# Patient Record
Sex: FEMALE | Race: OTHER | URBAN - METROPOLITAN AREA
[De-identification: names, ages, dates, MRNs, and addresses within clinical notes are randomized per-mention and may not be internally consistent; named-entity substitution may affect disease eponyms.]

---

## 2024-05-21 ENCOUNTER — INITIAL PRENATAL (OUTPATIENT)
Age: 34
End: 2024-05-21

## 2024-05-21 VITALS
DIASTOLIC BLOOD PRESSURE: 64 MMHG | BODY MASS INDEX: 21 KG/M2 | SYSTOLIC BLOOD PRESSURE: 110 MMHG | WEIGHT: 123 LBS | HEIGHT: 64 IN

## 2024-05-21 DIAGNOSIS — Z34.81 PRENATAL CARE, SUBSEQUENT PREGNANCY IN FIRST TRIMESTER: Primary | ICD-10-CM

## 2024-05-21 DIAGNOSIS — Z34.90 PREGNANCY, UNSPECIFIED GESTATIONAL AGE: ICD-10-CM

## 2024-05-21 PROCEDURE — 0500F INITIAL PRENATAL CARE VISIT: CPT | Performed by: OBSTETRICS & GYNECOLOGY

## 2024-05-21 NOTE — PROGRESS NOTES
Initial Prenatal Note    CC: Amenorrhea, positive pregnancy test    HPI:  Lindsay Thakkar is a 33 y.o. No obstetric history on file. who presents for initial prenatal appointment. Since her LMP she has experienced some nausea at times. She denies dysuria, discharge, vaginal bleeding.    LMP history:  The date of her LMP is  certain.  Her last menstrual period was normal.    Based on her LMP, her EDC is  and her EGA is 9 weeks,6 days.     Ultrasound data:  She had an  ultrasound done by the ultrasound tech today which revealed a viable katz pregnancy with a gestational age of 9 weeks and 4 days giving an EDC of 2024.    She is dated by FTUS=LMP.    Relevant past pregnancy history:  She has the following pregnancy history: SAB- 1 in January   She does not history of  delivery.    Relevant past medical history:   Noncontributory    Last Pap: 2023- normal per patient.     1. Have you been to the ER, urgent care clinic, or hospitalized since your last visit? This is the patients first visit with our practice.    2. Have you seen or consulted any other health care providers outside of the [facility] since your last visit?  This is the patients first visit with our practice.    She declines  a chaperone during the gynecologic exam today.      Liv Phillips MA

## 2024-05-21 NOTE — PROGRESS NOTES
Initial Obstetric Visit    Current pregnancy history:    Lindsay Thakkar is a  33 y.o. female Other Patient's last menstrual period was 03/13/2024 (exact date)..    She presents for the evaluation of amenorrhea and a positive pregnancy test.    LMP history:  The date of her LMP is 3/14/24 .    Her last menstrual period was normal and her LMP is certain.         Ultrasound data:  She had an ultrasound performed today in the office which revealed a viable katz pregnancy.  See ultrasound report for details.     Her final estimated due date is 12/19/24  DANIS is derived from her LMP, which is concordant with ultrasound dating.      Pregnancy symptoms:  Since her LMP she has experienced mild nausea.  She denies dysuria, discharge, vaginal bleeding.      Past pregnancy history:  She had a chemical pregnancy in [place], her OB there recommended progesterone once nightly vaginally 200mg.  She has been taking this without issue.      Relevant medical problems:  None      Social History:  Gilberto   - works as [employer] as a   Lindsay is not currently working  They are from [facility] originally     _ _ _ _ _ _ _ _ _ _ _ _ _ _ _ _ _ _ _ _ _ _ _ _ _ _ _ _ _ _ _ _     Substance history: Since first positive pregnancy test; negative for alcohol, tobacco and street drugs.             Exposure history:   There is/are no outdoor cat/s in the home. If yes, the patient was instructed to not change the cat litter.   She admits close contact with children on a regular basis.   She has had chicken pox or the vaccine in the past.   Patient denies issues with domestic violence.     Genetic Screening/Teratology Counseling: (Includes patient, baby's father, or anyone in either family with:)  1.  Patient's age >/= 35 at EDC?-- no  2.  Thalassemia (Mohawk, Greek, Mediterranean, or  background): MCV<80?--no.     3.  Neural tube defect (meningomyelocele, spina bifida, anencephaly)?--no.   4.

## 2024-06-12 ENCOUNTER — ROUTINE PRENATAL (OUTPATIENT)
Age: 34
End: 2024-06-12

## 2024-06-12 VITALS — SYSTOLIC BLOOD PRESSURE: 122 MMHG | BODY MASS INDEX: 21.34 KG/M2 | WEIGHT: 125 LBS | DIASTOLIC BLOOD PRESSURE: 60 MMHG

## 2024-06-12 DIAGNOSIS — Z34.90 PREGNANCY, UNSPECIFIED GESTATIONAL AGE: Primary | ICD-10-CM

## 2024-06-12 LAB
ABO + RH BLD: NORMAL
BLOOD BANK CMNT PATIENT-IMP: NORMAL
BLOOD GROUP ANTIBODIES SERPL: NORMAL
SPECIMEN EXP DATE BLD: NORMAL

## 2024-06-12 PROCEDURE — 0502F SUBSEQUENT PRENATAL CARE: CPT | Performed by: OBSTETRICS & GYNECOLOGY

## 2024-06-12 SDOH — ECONOMIC STABILITY: FOOD INSECURITY: WITHIN THE PAST 12 MONTHS, THE FOOD YOU BOUGHT JUST DIDN'T LAST AND YOU DIDN'T HAVE MONEY TO GET MORE.: NEVER TRUE

## 2024-06-12 SDOH — ECONOMIC STABILITY: HOUSING INSECURITY
IN THE LAST 12 MONTHS, WAS THERE A TIME WHEN YOU DID NOT HAVE A STEADY PLACE TO SLEEP OR SLEPT IN A SHELTER (INCLUDING NOW)?: NO

## 2024-06-12 SDOH — ECONOMIC STABILITY: FOOD INSECURITY: WITHIN THE PAST 12 MONTHS, YOU WORRIED THAT YOUR FOOD WOULD RUN OUT BEFORE YOU GOT MONEY TO BUY MORE.: NEVER TRUE

## 2024-06-12 SDOH — ECONOMIC STABILITY: INCOME INSECURITY: HOW HARD IS IT FOR YOU TO PAY FOR THE VERY BASICS LIKE FOOD, HOUSING, MEDICAL CARE, AND HEATING?: NOT HARD AT ALL

## 2024-06-12 ASSESSMENT — PATIENT HEALTH QUESTIONNAIRE - PHQ9
SUM OF ALL RESPONSES TO PHQ QUESTIONS 1-9: 0
SUM OF ALL RESPONSES TO PHQ QUESTIONS 1-9: 0
1. LITTLE INTEREST OR PLEASURE IN DOING THINGS: NOT AT ALL
2. FEELING DOWN, DEPRESSED OR HOPELESS: NOT AT ALL
SUM OF ALL RESPONSES TO PHQ9 QUESTIONS 1 & 2: 0
SUM OF ALL RESPONSES TO PHQ QUESTIONS 1-9: 0
SUM OF ALL RESPONSES TO PHQ QUESTIONS 1-9: 0

## 2024-06-12 NOTE — PROGRESS NOTES
Routine prenatal visit today.  Nausea persisting. Feels her PNV is causing more nausea, discussed changing gummy and taking after dinner.  Stop vaginal progesterone today.   Some mild kidney stone pain.  New OB labs and NIPT today. Will find out gender.

## 2024-06-13 LAB
BACTERIA SPEC CULT: NORMAL
SERVICE CMNT-IMP: NORMAL

## 2024-06-14 LAB
C TRACH RRNA SPEC QL NAA+PROBE: NEGATIVE
ERYTHROCYTE [DISTWIDTH] IN BLOOD BY AUTOMATED COUNT: 13 % (ref 11.5–14.5)
FERRITIN SERPL-MCNC: 52 NG/ML (ref 26–388)
HBV SURFACE AG SER QL: 0.15 INDEX
HBV SURFACE AG SER QL: NEGATIVE
HCT VFR BLD AUTO: 42 % (ref 35–47)
HCV AB SER IA-ACNC: <0.02 INDEX
HCV AB SERPL QL IA: NONREACTIVE
HGB BLD-MCNC: 14.2 G/DL (ref 11.5–16)
HIV 1+2 AB+HIV1 P24 AG SERPL QL IA: NONREACTIVE
HIV 1/2 RESULT COMMENT: NORMAL
MCH RBC QN AUTO: 31.7 PG (ref 26–34)
MCHC RBC AUTO-ENTMCNC: 33.8 G/DL (ref 30–36.5)
MCV RBC AUTO: 93.8 FL (ref 80–99)
N GONORRHOEA RRNA SPEC QL NAA+PROBE: NEGATIVE
NRBC # BLD: 0 K/UL (ref 0–0.01)
NRBC BLD-RTO: 0 PER 100 WBC
PLATELET # BLD AUTO: 221 K/UL (ref 150–400)
PMV BLD AUTO: 11.2 FL (ref 8.9–12.9)
RBC # BLD AUTO: 4.48 M/UL (ref 3.8–5.2)
RUBV IGG SERPL IA-ACNC: NORMAL IU/ML
SPECIMEN SOURCE: NORMAL
T PALLIDUM AB SER QL IA: NON REACTIVE
T VAGINALIS RRNA SPEC QL NAA+PROBE: NEGATIVE
VZV IGG SER IA-ACNC: 1339 INDEX
WBC # BLD AUTO: 6.6 K/UL (ref 3.6–11)

## 2024-06-17 LAB
HGB A MFR BLD: 97.4 % (ref 96.4–98.8)
HGB A2 MFR BLD COLUMN CHROM: 2.6 % (ref 1.8–3.2)
HGB F MFR BLD: 0 % (ref 0–2)
HGB FRACT BLD-IMP: NORMAL
HGB S MFR BLD: 0 %

## 2024-06-24 LAB
Lab: ABNORMAL
NTRA 22Q11.2 DELETION SYNDROME POPULATION-BASED RISK TEXT: ABNORMAL
NTRA 22Q11.2 DELETION SYNDROME RESULT TEXT: ABNORMAL
NTRA 22Q11.2 DELETION SYNDROME RISK SCORE TEXT: ABNORMAL
NTRA FETAL FRACTION: ABNORMAL
NTRA GENDER OF FETUS: ABNORMAL
NTRA MONOSOMY X AGE-BASED RISK TEXT: ABNORMAL
NTRA MONOSOMY X RESULT TEXT: ABNORMAL
NTRA MONOSOMY X RISK SCORE TEXT: ABNORMAL
NTRA TRIPLOIDY RESULT TEXT: ABNORMAL
NTRA TRISOMY 13 AGE-BASED RISK TEXT: ABNORMAL
NTRA TRISOMY 13 RESULT TEXT: ABNORMAL
NTRA TRISOMY 13 RISK SCORE TEXT: ABNORMAL
NTRA TRISOMY 18 AGE-BASED RISK TEXT: ABNORMAL
NTRA TRISOMY 18 RESULT TEXT: ABNORMAL
NTRA TRISOMY 18 RISK SCORE TEXT: ABNORMAL
NTRA TRISOMY 21 AGE-BASED RISK TEXT: ABNORMAL
NTRA TRISOMY 21 RESULT TEXT: ABNORMAL
NTRA TRISOMY 21 RISK SCORE TEXT: ABNORMAL

## 2024-06-25 ENCOUNTER — TELEPHONE (OUTPATIENT)
Age: 34
End: 2024-06-25

## 2024-06-25 DIAGNOSIS — O28.5 ABNORMAL GENETIC TEST DURING PREGNANCY: Primary | ICD-10-CM

## 2024-06-25 NOTE — TELEPHONE ENCOUNTER
Patient called inquiring when someone was going to reach out to her regarding getting her set up for a scan. She has not heard from their office yet.     I attempted to provide the pt with the number to [facility] but she stated that she was told that we would be able to get her seen sooner and that we would be making the appt for her. Please advise.

## 2024-06-26 ENCOUNTER — ROUTINE PRENATAL (OUTPATIENT)
Age: 34
End: 2024-06-26
Payer: COMMERCIAL

## 2024-06-26 ENCOUNTER — ROUTINE PRENATAL (OUTPATIENT)
Age: 34
End: 2024-06-26

## 2024-06-26 VITALS — HEART RATE: 91 BPM | DIASTOLIC BLOOD PRESSURE: 72 MMHG | SYSTOLIC BLOOD PRESSURE: 111 MMHG

## 2024-06-26 DIAGNOSIS — O35.13X1 MATERNAL CARE FOR (SUSPECTED) CHROMOSOMAL ABNORMALITY IN FETUS, TRISOMY 21, FETUS 1: Primary | ICD-10-CM

## 2024-06-26 DIAGNOSIS — O28.9 ABNORMAL ANTENATAL TEST: ICD-10-CM

## 2024-06-26 DIAGNOSIS — O28.5 ABNORMAL CHROMOSOMAL AND GENETIC FINDING ON ANTENATAL SCREENING OF MOTHER: ICD-10-CM

## 2024-06-26 DIAGNOSIS — Z3A.14 14 WEEKS GESTATION OF PREGNANCY: ICD-10-CM

## 2024-06-26 DIAGNOSIS — O28.0 ABNORMAL ANTENATAL ALPHA FETOPROTEIN SCREEN: Primary | ICD-10-CM

## 2024-06-26 PROCEDURE — 76805 OB US >/= 14 WKS SNGL FETUS: CPT | Performed by: OBSTETRICS & GYNECOLOGY

## 2024-06-26 PROCEDURE — 99203 OFFICE O/P NEW LOW 30 MIN: CPT | Performed by: OBSTETRICS & GYNECOLOGY

## 2024-06-26 NOTE — PROGRESS NOTES
Lindsay Thakkar is a 33 y.o. female seen on 06/26/24 in our [place] office for genetic counseling regarding her abnormal NIPT results. Lindsay Thakkar will be 34 at the Estimated Date of Delivery: 12/19/24. Genetic counseling was performed in person today. The patient was accompanied to her appointment by her partner and father of the baby (FOB), Rehan.    Impression and Recommendations:    - Lindsay had NIPT that was positive for Down syndrome, or trisomy 21, with a PPV of 95%.  - Down syndrome was reviewed, including further screening and testing options.  - The patient ELECTED to proceed with diagnostic amniocentesis, which has been scheduled for 7/8/24.  - The typical genetic testing algorithm for amniocentesis was reviewed, including FISH with reflex to karyotype or microarray, amniotic fluid alpha fetoprotein, and maternal cell contamination.  - If amniocentesis is not performed, an msAFP should be ordered between 15 and 24 weeks gestation to screen for open neural tube defects in the current pregnancy.  - An ultrasound and MFM consult were performed today by Dr. Richard Gold MD. Please see his note for further details.    The following information was discussed with the patient:    Lindsay previously opted for non-invasive prenatal testing (NIPT) ordered by her OB's office through [facility]. She had an abnormal result with an increased risk of Down syndrome. We reviewed that the performing lab determined a positive predictive value of her result at 95%. However, the risk of trisomy 18, trisomy 13, and sex chromosome abnormalities were within normal limits at less than 1 in 10,000.     The PPV is the likelihood that a person who has a positive test result does have the disease, condition, biomarker, or mutation (change) in the gene being tested.    Children with Down syndrome have multiple malformations, medical conditions, and cognitive impairment because of the presence of extra

## 2024-06-26 NOTE — PROCEDURES
PATIENT: GISELA SHEETS   -  : 1990   -  DOS:2024   -  INTERPRETING PROVIDER:Richard Gold,   Indication  ========    Panorama high risk for Trisomy 21    Method  ======    Transabdominal ultrasound examination. View: Suboptimal view: limited by early gestational age    Dating  ======    LMP on: 3/14/2024  Cycle: regular cycle  GA by LMP 14 w + 6 d  DANIS by LMP: 2024  Previous Ultrasound on: 2024  Type of prior assessment: GA  GA at prior assessment date 9 w + 4 d  GA by previous U/S 14 w + 5 d  DANIS by previous Ultrasound: 2024  Ultrasound examination on: 2024  GA by U/S based upon: AC, BPD, Femur, HC  GA by U/S 15 w + 0 d  DANIS by U/S: 2024  Assigned: based on the LMP, selected on 2024  Assigned GA 14 w + 6 d  Assigned DANIS: 2024    Fetal Growth Overview  =================    Exam date        GA              BPD (mm)          HC (mm)              AC (mm)             FL (mm)             HL (mm)             EFW (g)  2024        14w 6d        29.8     71%        107.4    45%        85.1     53%        14.6    23%        17.9     71%        102    22%    Fetal Biometry  ============    Standard  BPD 29.8 mm 15w 3d 71% Hadlock  OFD 36.6 mm 15w 1d 62% Beatrice  .4 mm 15w 1d 45% Hadlock  AC 85.1 mm 14w 6d 53% Hadlock  Femur 14.6 mm 14w 2d 23% Hadlock  Humerus 17.9 mm 15w 1d 71% Beatrice   g 14w 3d 22% Hadlock  EFW (lb) 0 lb  EFW (oz) 4 oz  EFW by: Hadlock (BPD-HC-AC-FL)  Head / Face / Neck  Nasal bone: present  Other Structures   bpm    General Evaluation  ==============    Cardiac activity present.  bpm. Fetal movements: visualized. Presentation: Transverse, head to maternal left  Placenta: Placental site: fundal  Umbilical cord: Cord vessels: 3 vessel cord. Insertion site: central  Amniotic fluid: Amount of AF: normal. MVP 6.3 cm    Fetal Anatomy  ===========    Cranium: normal  Lateral ventricles: NOT VISUALIZED  Choroid

## 2024-06-27 ENCOUNTER — TELEPHONE (OUTPATIENT)
Age: 34
End: 2024-06-27

## 2024-06-27 NOTE — TELEPHONE ENCOUNTER
I spoke to the patient, Lindsay Thakkar, today over the phone after she requested a call via [employer].     The patient reports lots of anxiety regarding her abnormal NIPT result and was wondering if there were any other providers in [place] that would do an amniocentesis prior to 16 weeks gestation. Per the [facility] Prenatal Coordinator they do not have any spots available before the patient's current scheduled amniocentesis appointment with our office on 7/8/24.    The patient would like to move her appointment as soon as possible, so her amniocentesis is now scheduled for 8 am on 7/5/24. Her follow up genetic counseling visit for amnio consent and to review genetic testing options is scheduled for tomorrow, 6/28/24, at 8 am.    The patient verbalized her understanding of the information as it was presented to her today; she denies any further questions or concerns at this time.    Diane Petersen, MS, [facility]  Licensed, Certified Genetic Counselor

## 2024-06-28 ENCOUNTER — TELEMEDICINE (OUTPATIENT)
Age: 34
End: 2024-06-28

## 2024-06-28 DIAGNOSIS — Z3A.15 15 WEEKS GESTATION OF PREGNANCY: ICD-10-CM

## 2024-06-28 DIAGNOSIS — O35.13X1 MATERNAL CARE FOR (SUSPECTED) CHROMOSOMAL ABNORMALITY IN FETUS, TRISOMY 21, FETUS 1: Primary | ICD-10-CM

## 2024-06-28 DIAGNOSIS — O28.5 ABNORMAL CHROMOSOMAL AND GENETIC FINDING ON ANTENATAL SCREENING OF MOTHER: ICD-10-CM

## 2024-06-28 NOTE — PROGRESS NOTES
Lindsay Thakkar is a 33 y.o. female seen on 06/28/24 in our [place] office for follow up genetic counseling regarding her upcoming scheduled amniocentesis. Lindsay Thakkar will be 34 at the Estimated Date of Delivery: 12/19/24. Genetic counseling was performed via Telemedicine today. The patient was unaccompanied to her appointment.    Impression and Recommendations:    - The patient's previously abnormal NIPT results indicated an increased risk for trisomy 21, or Down syndrome with a PPV of 95%; this was reviewed today.  - The patient ELECTED to proceed with diagnostic amniocentesis at her upcoming [facility] appointment on 7/5/24 once she is 16 weeks gestation.   - FISH with reflex to either fetal karyotype or chromosomal microarray will be performed, along with maternal cell contamination studies.  - Amniotic fluid alpha fetoprotein is also recommended to screen for open neural tube defects in the current pregnancy.  - All testing will be ordered through [facility].  - The patient is scheduled for an ultrasound, amniocentesis and [facility] follow up on 7/5/24.     The following information was discussed with the patient at her initial genetic counseling appointment and was reviewed again today:    Lindsay previously opted for non-invasive prenatal testing (NIPT) ordered by her OB's office through [facility]. She had an abnormal result with an increased risk of Down syndrome. We reviewed that the performing lab determined a positive predictive value of her result at 95%. However, the risk of trisomy 18, trisomy 13, and sex chromosome abnormalities were within normal limits at less than 1 in 10,000.      The PPV is the likelihood that a person who has a positive test result does have the disease, condition, biomarker, or mutation (change) in the gene being tested.     We reviewed that in approximately 95% of children with Down syndrome, the condition is sporadic because of nonfamilial trisomy 21, in which there

## 2024-07-05 ENCOUNTER — ROUTINE PRENATAL (OUTPATIENT)
Age: 34
End: 2024-07-05

## 2024-07-05 VITALS — SYSTOLIC BLOOD PRESSURE: 119 MMHG | DIASTOLIC BLOOD PRESSURE: 79 MMHG | HEART RATE: 97 BPM

## 2024-07-05 DIAGNOSIS — O35.13X1 MATERNAL CARE FOR (SUSPECTED) CHROMOSOMAL ABNORMALITY IN FETUS, TRISOMY 21, FETUS 1: ICD-10-CM

## 2024-07-05 DIAGNOSIS — Z3A.16 16 WEEKS GESTATION OF PREGNANCY: ICD-10-CM

## 2024-07-05 DIAGNOSIS — O35.13X1 MATERNAL CARE FOR (SUSPECTED) CHROMOSOMAL ABNORMALITY IN FETUS, TRISOMY 21, FETUS 1: Primary | ICD-10-CM

## 2024-07-05 DIAGNOSIS — O28.5 ABNORMAL CHROMOSOMAL AND GENETIC FINDING ON ANTENATAL SCREENING OF MOTHER: ICD-10-CM

## 2024-07-05 NOTE — PROCEDURES
PATIENT: GISELA SHEETS   -  : 1990   -  DOS:2024   -  INTERPRETING PROVIDER:Kat Multani,   Indication  ========    Panorama high risk for Trisomy 21    Method  ======    Transabdominal ultrasound examination. View: Limited by early gestational age    Pregnancy  =========    [place] pregnancy. Number of fetuses: 1    Dating  ======    LMP on: 3/14/2024  Cycle: regular cycle  GA by LMP 16 w + 1 d  DANIS by LMP: 2024  Previous Ultrasound on: 2024  Type of prior assessment: GA  GA at prior assessment date 9 w + 4 d  GA by previous U/S 16 w + 0 d  DANIS by previous Ultrasound: 2024  Assigned: based on the LMP, selected on 2024  Assigned GA 16 w + 1 d  Assigned DANIS: 2024    General Evaluation  ==============    Cardiac activity present.  bpm. Fetal movements: visualized. Presentation: BREECH  Placenta: Placental site: fundal, anterior  Umbilical cord: Cord vessels: 3 vessel cord  Amniotic fluid: Amount of AF: normal. MVP 4.6 cm    Findings  =======    GISELA HERNANDEZ desires invasive testing for definitive fetal karyotype diagnosis. Patient is assisted by the sonographer. She provided informed consent for  invasive testing after the risks, benefits, alternatives of the amniocentesis were discussed and questions were answered.    Amniocentesis unable to be performed due to limitation of persistent fetal lie.    Consultation  ==========    This 33-year old is a  with the following issues:    1) NIPT showing increased trisomy 21 risk  -the patient met with genetic counseling, see that note for details.    Patient was counseled on the findings. We reviewed her risk of approximately 1/400 of rupture of membranes with amniocentesis and that this risk is increased in abnormal  pregnancies. We discussed risk of loss of the pregnancy with ROM and of permanent organ damage involving any organ system including brain and musculoskeletal with  prolonged ROM.

## 2024-07-08 ENCOUNTER — ROUTINE PRENATAL (OUTPATIENT)
Age: 34
End: 2024-07-08
Payer: COMMERCIAL

## 2024-07-08 VITALS — DIASTOLIC BLOOD PRESSURE: 69 MMHG | SYSTOLIC BLOOD PRESSURE: 112 MMHG | HEART RATE: 88 BPM

## 2024-07-08 DIAGNOSIS — O28.5 ABNORMAL CHROMOSOMAL AND GENETIC FINDING ON ANTENATAL SCREENING OF MOTHER: Primary | ICD-10-CM

## 2024-07-08 PROCEDURE — 99213 OFFICE O/P EST LOW 20 MIN: CPT | Performed by: OBSTETRICS & GYNECOLOGY

## 2024-07-08 PROCEDURE — 76946 ECHO GUIDE FOR AMNIOCENTESIS: CPT | Performed by: OBSTETRICS & GYNECOLOGY

## 2024-07-08 PROCEDURE — 59000 AMNIOCENTESIS DIAGNOSTIC: CPT | Performed by: OBSTETRICS & GYNECOLOGY

## 2024-07-08 PROCEDURE — 76815 OB US LIMITED FETUS(S): CPT | Performed by: OBSTETRICS & GYNECOLOGY

## 2024-07-09 LAB
AFP ADJ MOM AMN: 0.65
AFP AMN-MCNC: 8.8 UG/ML
AFP AMNIOTIC FLUID: NORMAL
AFP INTERP AMN-IMP: NORMAL
GA (WEEKS): 16 WK
LAB DIRECTOR NAME PROVIDER: NORMAL
SPECIMEN STATUS REPORT: NORMAL

## 2024-07-09 NOTE — PROCEDURES
PATIENT: GISELA SHEETS   -  : 1990   -  DOS:2024   -  INTERPRETING PROVIDER:Sathish Roldan,   Indication  ========    Amniocenteis for positive NIPT for T-21.    History  ======    General History  Blood group: 0  Rhesus: Rh positive  Blood group details: Negative screen  Smoking: no  Medical History  Allergies: No allergies identified  Past medical history: No previous diseases identified  Past surgical history: No previous surgeries performed  Infections: No infections identified    Method  ======    Transabdominal ultrasound examination, Ultrasound-guided amniocentesis. View: Sufficient    Pregnancy  =========    [place] pregnancy. Number of fetuses: 1    Dating  ======    LMP on: 3/14/2024  Cycle: regular cycle  GA by LMP 16 w + 4 d  DANIS by LMP: 2024  Previous Ultrasound on: 2024  Type of prior assessment: GA  GA at prior assessment date 9 w + 4 d  GA by previous U/S 16 w + 3 d  DANIS by previous Ultrasound: 2024  Assigned: based on the LMP, selected on 2024  Assigned GA 16 w + 4 d  Assigned DANIS: 2024    General Evaluation  ==============    Cardiac activity present.  bpm. Fetal movements: visualized. Presentation: Cephalic  Placenta: Placental site: fundal, anterior  Umbilical cord: Cord vessels: 3 vessel cord  Amniotic fluid: Amount of AF: normal. MVP 3.9 cm    Fetal Anatomy  ===========    The following structures were visualized:  Cranium.  Brain.  Face.  Heart.  GI tract.  Urogenital tract.  Arms.    Amniocentesis  ============    Start 9:50 AM. End 9:51 AM  Instrument: TA 20G needle. Insertion site: Mid-uterus. Method: transamniotic. Entries uterus: 1  Sample: obtained. Sample amount 29 ml. Sample quality: Clear yellow  Sample identification confirmed  Uncomplicated.    Evaluation  ========    Post cardiac activity: present, normal. FHR post 154 bpm  Post NST not performed  Post AF assessment not

## 2024-07-11 ENCOUNTER — TELEPHONE (OUTPATIENT)
Age: 34
End: 2024-07-11

## 2024-07-11 NOTE — TELEPHONE ENCOUNTER
The patient, Lindsay Thakkar, called in to see if FISH results from amnio performed 7/8/24 were available.     I reviewed with the patient that [facility] had reached out yesterday via phone to let us know that there was a slight delay on this patient's FISH results and that they expected results today, 7/11/24. As of yet, no results are available in the [facility] portal.    We did review that the amniotic fluid alpha fetoprotein value is NOT ELEVATED for the gestational age provided.    Patient requested another appointment for her and the FOB to ask more questions regarding results; this has been scheduled as a virtual visit tomorrow, 7/12/24 at 9 am.    Advised patient that I will check the [facility] results portal later this afternoon and will call her if results come back today. If not, I will send her a [employer] message with an update.     The patient verbalized her understanding of the information as it was presented to her today; she denies any further questions or concerns at this time.    Diane Petersen, MS, [facility]  Licensed, Certified Genetic Counselor

## 2024-07-11 NOTE — TELEPHONE ENCOUNTER
I spoke to the patient, Lindsay Thakkar, today over the phone to review her abnormal FISH results.    The patient previously elected to proceed with diagnostic amniocentesis on 7/8/24 after NIPT showed an increased risk for trisomy 21, or Down syndrome.    Fluorescence in situ hybridization (FISH) analysis of uncultured amniocytes revealed three chromosome 21 signals. A single signal was observed for the X and Y chromosomes and two signals were observed for chromosomes 13 and 18. These results are consistent with a male fetus with trisomy 21.     The patient's final karyotype results to distinguish nondisjunction trisomy 21 from translocation trisomy 21 are currently pending. We reviewed that these final results will help determine recurrence risks for any future pregnancies the patient may have.    The patient elected to proceed with the scheduled VV tomorrow at 9 am to further review these results with both herself and her  and to answer any questions that arise in the meantime.    The patient verbalized her understanding of the information as it was presented to her today; she denies any further questions or concerns at this time.    Diane Petersen MS, [facility]  Licensed, Certified Genetic Counselor

## 2024-07-12 ENCOUNTER — TELEMEDICINE (OUTPATIENT)
Age: 34
End: 2024-07-12

## 2024-07-12 DIAGNOSIS — O35.13X1 FETAL TRISOMY 21 AFFECTING CARE OF MOTHER, ANTEPARTUM, FETUS 1: Primary | ICD-10-CM

## 2024-07-12 DIAGNOSIS — Z3A.17 17 WEEKS GESTATION OF PREGNANCY: ICD-10-CM

## 2024-07-12 NOTE — PROGRESS NOTES
Lindsay Thakkar is a 33 y.o. female seen on 07/12/24 in our [place] office for follow up genetic counseling regarding the abnormal FISH results from amniocentesis performed 7/8/24. Lindsay Thakkar will be 34 at the Estimated Date of Delivery: 12/19/24. Genetic counseling was performed via Telemedicine today. The patient was accompanied to her appointment by her partner and father of the baby (FOB), Rehan.    Impression and Recommendations:    - The patient's FISH results were positive for Down syndrome; final karyotype results are pending.  - Down syndrome was reviewed in depth, including relevant risk information and support group availability.  - The couple has ELECTED to proceed with termination of pregnancy; a referral has been sent to [facility] via secure email.   - The patient is currently scheduled for follow up ultrasound and MFM consult on 8/5/24; however, if the patient proceeds with TOP, her appointments with us will be cancelled.    The following information was discussed with the patient:    The patient previously elected to proceed with diagnostic amniocentesis on 7/8/24 after NIPT showed an increased risk for trisomy 21, or Down syndrome.     Fluorescence in situ hybridization (FISH) analysis of uncultured amniocytes revealed three chromosome 21 signals. A single signal was observed for the X and Y chromosomes and two signals were observed for chromosomes 13 and 18. These results are consistent with a male fetus with trisomy 21.     Children with Down syndrome have multiple malformations, medical conditions, and cognitive impairment because of the presence of extra genetic material from chromosome 21. The phenotype can be quite variable.  Among the more common physical findings are hypotonia, brachycephaly, epicanthal folds, flat nasal bridge, upward-slanting palpebral fissures, Brushfield spots, small mouth, small ears, excessive skin at the nape of the neck, single

## 2024-07-17 ENCOUNTER — TELEPHONE (OUTPATIENT)
Age: 34
End: 2024-07-17

## 2024-07-17 NOTE — TELEPHONE ENCOUNTER
I called Lindsay yesterday evening to check on her after the diagnosis of trisomy 21 from amnio confirmed.  She is understandably sad and tearful.  They have decided to terminate the pregnancy and are waiting for [facility] to call back.  Condolences and reassurance provided.   She would like to follow-up with me after the procedure, and I advised that we would be glad to see her for follow-up.     Shital Weeks MD

## 2024-07-18 ENCOUNTER — TELEPHONE (OUTPATIENT)
Age: 34
End: 2024-07-18

## 2024-07-18 NOTE — TELEPHONE ENCOUNTER
Patient called, name and  verified. Patient is calling as she has been scheduled an appt by the genetic counselor with [facility] but has read online reviews that it was not a good facility. She would like to get your thoughts. [facility] could not see her for another 2 weeks.     Pt states if you would like for her to go to [facility] could we work on getting her a sooner appt than for 2 weeks out.    She is also asking for your guidance on wether or not she should have the procedure done fully sedated or partially? Please advise.

## 2024-07-18 NOTE — TELEPHONE ENCOUNTER
I spoke to the patient, Lindsay Thakkar, and let her know that [facility] cannot get her in until the week of July 29th at the earliest, but that [facility] can get her in next week. Clinic and doc info given to patient, and referral sent to Julia at [facility]. Julia will reach out to the patient directly to schedule.    The patient verbalized her understanding of the information as it was presented to her today; she denies any further questions or concerns at this time.    Diane Petersen MS, [facility]  Licensed, Certified Genetic Counselor

## 2024-07-18 NOTE — TELEPHONE ENCOUNTER
The patient, Lindsay Thakkar, called the office yesterday afternoon requesting a call back as she had not yet heard from [facility]. Reached out this morning, pt did not answer, lvm requesting call back.    Diane Petersen MS, [facility]  Licensed, Certified Genetic Counselor

## 2024-07-18 NOTE — TELEPHONE ENCOUNTER
I spoke to the patient, Lindsay Thakkar, who stated that she had spoken with both Julia at [facility] and Anushka at [facility] and she is currently scheduled for TOP on 7/29 and 7/30 at [facility].     We reviewed that her final fetal karyotype results are still pending, and I will contact the patient as soon as results are available.    The patient verbalized her understanding of the information as it was presented to her today; she denies any further questions or concerns at this time.    Diane Petersen MS, [facility]  Licensed, Certified Genetic Counselor

## 2024-07-23 ENCOUNTER — TELEPHONE (OUTPATIENT)
Age: 34
End: 2024-07-23

## 2024-07-23 NOTE — TELEPHONE ENCOUNTER
I attempted to contact the patient, Lindsay Thakkar, today over the phone to review her abnormal karyotype results from amniocentesis performed 7/8/24.    The patient did not answer; m asking for return call back.    Diane Petersen MS, [facility]  Licensed, Certified Genetic Counselor

## 2024-07-23 NOTE — TELEPHONE ENCOUNTER
I spoke to the patient, Lindsay Thakkar, today over the phone to review her final, abnormal karyotype results from amniocentesis performed 7/8/24.    The patient previously elected to proceed with diagnostic amniocentesis after NIPT was positive for Down syndrome. The patient's initial FISH results were consistent with Down syndrome, and a follow up fetal karyotype was performed to distinguish nondisjunction trisomy 21 versus translocation trisomy 21.     Cytogenetic analysis of in-situ amniocyte cultures revealed a MALE karyotype with TRISOMY 21 in all metaphases analyzed. This result is consistent with the diagnosis of (non-familial) DOWN syndrome, or trisomy 21 caused by nondisjunction. Therefore, the patient's recurrence risk is 1% until she is 39 years or older at delivery, at which point her age related risk is greater than 1%.    The patient had questions today regarding any further genetic testing available before or during future pregnancies. We reviewed today that due to the fetal karyotype results, parental karyotypes are not recommended. However, should the patient want to pursue this testing we are happy to order it. Patient requested information on parental karyotypes be sent to her via [employer].    Karyotyping involves culturing the cells and staining the chromosomes so that they can be visualized under a microscope. A karyotype looks at chromosomes to evaluate for aneuploidy as well as to identify any structural changes (i.e. translocations, ring chromosomes, large deletions or duplications, etc). Most cases of aneuploidy are secondary to sporadic non-disjunction resulting in free trisomy or monosomy and are associated with a low risk for recurrence; however, a small number of cases are secondary to a translocation or other rearrangement that can recur within families. It was emphasized to the patient that parental karyotypes are unlikely to provide new information. The patient is considering

## 2024-07-26 LAB
AFP ADJ MOM AMN: 0.65
AFP AMN-MCNC: 8.8 UG/ML
AFP AMNIOTIC FLUID: NORMAL
AFP INTERP AMN-IMP: NORMAL
CELLS ANALYZED AMN: 15
CELLS ANALYZED AMN: 50
CELLS COUNTED AMN: 15
CELLS COUNTED AMN: 50
CELLS KARYOTYPED.TOTAL AMN: 2
CHR 13+18+21+X+Y ANEUP AMN/CVS FISH: NORMAL
CHROM ANALY INTERPHASE AMN FISH-IMP: NORMAL
CHROM ANALY OVERALL INTERP-IMP: NORMAL
CHROM ANALY RESULT (ISCN): NORMAL
CLINICAL CYTOGENETICIST SPEC: NORMAL
CLINICAL CYTOGENETICIST SPEC: NORMAL
COLONIES COUNTED AMN: 15
GA (WEEKS): 16 WK
GENE XXX MUT ANL BLD/T: NORMAL
GENOMIC SOURCE CLASS: NORMAL
ISCN BAND LEVEL AMN QL: 450
LAB DIRECTOR NAME PROVIDER: NORMAL
LAB DIRECTOR NAME PROVIDER: NORMAL
REFLEX: NORMAL
SPECIMEN SOURCE: NORMAL
SPECIMEN SOURCE: NORMAL
SPECIMEN STATUS REPORT: NORMAL

## 2024-07-31 ENCOUNTER — TELEPHONE (OUTPATIENT)
Age: 34
End: 2024-07-31

## 2024-07-31 ENCOUNTER — HOSPITAL ENCOUNTER (EMERGENCY)
Facility: HOSPITAL | Age: 34
Discharge: HOME | End: 2024-07-31
Payer: COMMERCIAL

## 2024-07-31 VITALS
TEMPERATURE: 98.9 F | HEIGHT: 64 IN | DIASTOLIC BLOOD PRESSURE: 64 MMHG | HEART RATE: 96 BPM | WEIGHT: 114.2 LBS | OXYGEN SATURATION: 97 % | RESPIRATION RATE: 18 BRPM | BODY MASS INDEX: 19.5 KG/M2 | SYSTOLIC BLOOD PRESSURE: 102 MMHG

## 2024-07-31 DIAGNOSIS — N30.01 ACUTE CYSTITIS WITH HEMATURIA: Primary | ICD-10-CM

## 2024-07-31 LAB
APPEARANCE UR: ABNORMAL
BACTERIA URNS QL MICRO: ABNORMAL /HPF
BILIRUB UR QL: NEGATIVE
COLOR UR: ABNORMAL
EPITH CASTS URNS QL MICRO: ABNORMAL /LPF
GLUCOSE UR STRIP.AUTO-MCNC: NEGATIVE MG/DL
HGB UR QL STRIP: ABNORMAL
KETONES UR QL STRIP.AUTO: NEGATIVE MG/DL
LEUKOCYTE ESTERASE UR QL STRIP.AUTO: ABNORMAL
MUCOUS THREADS URNS QL MICRO: ABNORMAL /LPF
NITRITE UR QL STRIP.AUTO: NEGATIVE
PH UR STRIP: 5.5 (ref 5–8)
PROT UR STRIP-MCNC: 30 MG/DL
RBC #/AREA URNS HPF: >100 /HPF (ref 0–5)
SP GR UR REFRACTOMETRY: 1.02
UROBILINOGEN UR QL STRIP.AUTO: 1 EU/DL (ref 0.2–1)
WBC URNS QL MICRO: >100 /HPF (ref 0–4)

## 2024-07-31 PROCEDURE — 87086 URINE CULTURE/COLONY COUNT: CPT

## 2024-07-31 PROCEDURE — 6370000000 HC RX 637 (ALT 250 FOR IP)

## 2024-07-31 PROCEDURE — 81001 URINALYSIS AUTO W/SCOPE: CPT

## 2024-07-31 PROCEDURE — 87186 SC STD MICRODIL/AGAR DIL: CPT

## 2024-07-31 PROCEDURE — 87088 URINE BACTERIA CULTURE: CPT

## 2024-07-31 PROCEDURE — 99283 EMERGENCY DEPT VISIT LOW MDM: CPT

## 2024-07-31 RX ORDER — PHENAZOPYRIDINE HYDROCHLORIDE 100 MG/1
100 TABLET, FILM COATED ORAL ONCE
Status: COMPLETED | OUTPATIENT
Start: 2024-07-31 | End: 2024-07-31

## 2024-07-31 RX ORDER — PHENAZOPYRIDINE HYDROCHLORIDE 100 MG/1
100 TABLET, FILM COATED ORAL 3 TIMES DAILY PRN
Qty: 9 TABLET | Refills: 0 | Status: SHIPPED | OUTPATIENT
Start: 2024-07-31 | End: 2024-08-03

## 2024-07-31 RX ORDER — ACETAMINOPHEN 500 MG
1000 TABLET ORAL
Status: DISCONTINUED | OUTPATIENT
Start: 2024-07-31 | End: 2024-07-31 | Stop reason: HOSPADM

## 2024-07-31 RX ORDER — NITROFURANTOIN 25; 75 MG/1; MG/1
100 CAPSULE ORAL 2 TIMES DAILY
Qty: 20 CAPSULE | Refills: 0 | Status: SHIPPED | OUTPATIENT
Start: 2024-07-31 | End: 2024-08-10

## 2024-07-31 RX ADMIN — PHENAZOPYRIDINE 100 MG: 100 TABLET ORAL at 17:37

## 2024-07-31 ASSESSMENT — PAIN SCALES - GENERAL: PAINLEVEL_OUTOF10: 0

## 2024-07-31 NOTE — TELEPHONE ENCOUNTER
Spoke with pt, per provider she is aware that she needs to be seen at urgent care due to her recently having had surgery. She is aware and verbalized understanding.

## 2024-07-31 NOTE — TELEPHONE ENCOUNTER
Patient is calling back as she reports she is starting to be in a lot of pain and is requesting an abx vs an office visit. Is this something we can accommodate?

## 2024-07-31 NOTE — TELEPHONE ENCOUNTER
Patient called in, name and  verified. Patient is calling as she just had a D&C performed YESTERDAY with [facility] and she reports that today she has a urine urgency and a burning sensation at the end of urination. Would you like to send something in or have her drop off a urine sample. Please let me know what message you would like relayed to your pt.    Triage notes: Pt can be reached @: [phone]

## 2024-07-31 NOTE — ED NOTES
Discharge instructions given to patient by PA and RN. Pt has been given counseling regarding at home treatment plan. Pt verbalizes understanding of need to seek further treatment if symptoms worsen. Pt ambulated off of unit in no signs of distress.

## 2024-07-31 NOTE — ED TRIAGE NOTES
Pt ambulatory to triage, had D&C yesterday. This morning started having urinary frequency and burning. Spoke with her OB who referred her to get urine tested.     Denies fevers/chills

## 2024-07-31 NOTE — ED PROVIDER NOTES
[facility] EMERGENCY DEPT  EMERGENCY DEPARTMENT ENCOUNTER       Pt Name: Lindsay Thakkar  MRN: [id number]  Birthdate 1990  Date of evaluation: 7/31/2024  Provider: Urvashi Bergman PA-C   PCP: Unknown, Provider, APRN - NP  Note Started: 5:15 PM EDT 7/31/24     CHIEF COMPLAINT       Chief Complaint   Patient presents with    Urinary Frequency        HISTORY OF PRESENT ILLNESS: 1 or more elements      History From: Patient  HPI Limitations: None     Lindsay Thakkar is a 33 y.o. female who presents to the emergency department for evaluation of burning with urination.  Patient ports that she had a D&C procedure yesterday, states the procedure went well, she is feeling well after the procedure.  Patient states that the bleeding has continued to decline as her OB/GYN stated fluid.  Patient states that today when she went to the bathroom she noticed some burning after urinating, so she called her OB/GYN.  Her OB/GYN was able to evaluate her in office today, and collected a urine sample.  Because the urine sample collected by the OB/GYN would not result for couple of days, and patient wanted treatment sooner than that, OB/GYN recommended patient go to urgent care.  Patient went to urgent care, urgent care stated that patient should come to the emergency department.  Patient denies any fever, chills, worsening abdominal pain, heavy vaginal bleeding, or additional symptoms.  Patient states that the only thing she is experiencing is burning with urination, similar to previous UTIs.     Nursing Notes were all reviewed and agreed with or any disagreements were addressed in the HPI.     REVIEW OF SYSTEMS      Review of Systems     Positives and Pertinent negatives as per HPI.    PAST HISTORY     Past Medical History:  Past Medical History:   Diagnosis Date    Kidney stone        Past Surgical History:  No past surgical history on file.    Family History:  Family History   Problem Relation Age of Onset    High Blood

## 2024-08-03 LAB
BACTERIA SPEC CULT: ABNORMAL
BACTERIA SPEC CULT: ABNORMAL
CC UR VC: ABNORMAL
SERVICE CMNT-IMP: ABNORMAL

## 2024-08-08 ENCOUNTER — TELEPHONE (OUTPATIENT)
Age: 34
End: 2024-08-08

## 2024-08-08 NOTE — TELEPHONE ENCOUNTER
The patient, Lindsay Thakkar, called our office today stating that she would like to proceed with the largest [employer] Horizon carrier screening panel for 500+ conditions; however, Dr. Weeks, her OB, is not able to order that panel as it is a custom panel with [employer].     I reviewed with Lindsay the option of shipping a saliva kit to her house. The patient would prefer to have her blood collected at Dr. Weeks's office tomorrow, so we reviewed the option of Dr. Weeks ordering her normal Horizon carrier screening panel, and then I could reach out to the lab directly to change it to the 500+ Horizon panel.    Patient would like to see what Dr. Weeks says tomorrow and then proceed from there.    The patient verbalized her understanding of the information as it was presented to her today; she denies any further questions or concerns at this time.    Diane Petersen MS, [facility]  Licensed, Certified Genetic Counselor

## 2024-08-09 ENCOUNTER — OFFICE VISIT (OUTPATIENT)
Age: 34
End: 2024-08-09
Payer: COMMERCIAL

## 2024-08-09 VITALS
DIASTOLIC BLOOD PRESSURE: 60 MMHG | BODY MASS INDEX: 21.17 KG/M2 | SYSTOLIC BLOOD PRESSURE: 98 MMHG | RESPIRATION RATE: 14 BRPM | WEIGHT: 124 LBS | HEIGHT: 64 IN

## 2024-08-09 DIAGNOSIS — N30.00 ACUTE CYSTITIS WITHOUT HEMATURIA: ICD-10-CM

## 2024-08-09 DIAGNOSIS — Z13.71 GENETIC DISEASE CARRIER STATUS TESTING, FEMALE: ICD-10-CM

## 2024-08-09 DIAGNOSIS — Z87.42 HISTORY OF TERMINATION OF PREGNANCY: Primary | ICD-10-CM

## 2024-08-09 PROCEDURE — 99214 OFFICE O/P EST MOD 30 MIN: CPT | Performed by: OBSTETRICS & GYNECOLOGY

## 2024-08-09 ASSESSMENT — PATIENT HEALTH QUESTIONNAIRE - PHQ9
SUM OF ALL RESPONSES TO PHQ QUESTIONS 1-9: 2
SUM OF ALL RESPONSES TO PHQ9 QUESTIONS 1 & 2: 2
SUM OF ALL RESPONSES TO PHQ QUESTIONS 1-9: 2
2. FEELING DOWN, DEPRESSED OR HOPELESS: SEVERAL DAYS
1. LITTLE INTEREST OR PLEASURE IN DOING THINGS: SEVERAL DAYS
SUM OF ALL RESPONSES TO PHQ QUESTIONS 1-9: 2
SUM OF ALL RESPONSES TO PHQ QUESTIONS 1-9: 2

## 2024-08-09 NOTE — PROGRESS NOTES
Chief Complaint   Patient presents with    Follow-up     Pt being seen for follow up from her Surgery at [facility] on 2024. Does relay that she had an UTI and is still on antibiotics.     Pt states she is having some sadness but that she is doing ok.    Patient had US in our office today:   TV ULTRASOUND THE UTERUS IS RETROVERTED, NORMAL IN SIZE AND ECHOGENICITY. THE ENDOMETRIUM MEASURES 6.3MM IN THICKNESS. THERE APPEARS TO BE MIXED ECHOGENICITY SEEN WITHIN THE ENDOMETRIAL LINING, POSSIBLE BLOOD CLOT. RIGHT OVARY APPEARS WNL. LEFT OVARY APPEARS WNL. NO FREE FLUID IS SEEN IN THE CDS.        Ob/Gyn Hx:    LMP - had D & C   Hx of STI - none  SA - not currently     Health Maintenance:  Last Pap:     1. Have you been to the ER, urgent care clinic, or hospitalized since your last visit? Yes, [facility]    2. Have you seen or consulted any other health care providers outside of the [facility] since your last visit? no    Patient declines chaperone.    Lakeshia Tucker LPN

## 2024-08-09 NOTE — PROGRESS NOTES
Problem Visit    Lindsay Thakkar is a 33 y.o.  presenting for problem visit.     Her main concern today is follow-up after pregnancy termination.  She underwent a D&E on 7/30 at [facility] for a fetal diagnosis of trisomy 21.   She is having brown discharge, only had bleeding for 1-2 days following the procedure.  Reports a mild amount of cramping.      She desires a 500 panel carrier screening for both herself and her . Relays our genetic counselor can help with the order.     They are considering IVF for future conception. Several questions about future planning for conception.     She was diagnosed with a UTI from the emergency room, and is still currently taking antibiotics (macrobid).     7/31 urine culture:        >100,000  COLONIES/mL [facility]   Culture    Escherichia coli Abnormal  [facility]   Culture    Enterococcus faecalis Abnormal       Past Medical History:   Diagnosis Date    Kidney stone        Past Surgical History:   Procedure Laterality Date    DILATION AND CURETTAGE  07/30/2024       Family History   Problem Relation Age of Onset    High Blood Pressure Father        Social History     Socioeconomic History    Marital status:      Spouse name: Not on file    Number of children: Not on file    Years of education: Not on file    Highest education level: Not on file   Occupational History    Not on file   Tobacco Use    Smoking status: Never    Smokeless tobacco: Never   Vaping Use    Vaping status: Never Used   Substance and Sexual Activity    Alcohol use: Not Currently    Drug use: Never    Sexual activity: Yes     Partners: Male   Other Topics Concern    Not on file   Social History Narrative    Not on file     Social Determinants of Health     Financial Resource Strain: Low Risk  (6/12/2024)    Overall Financial Resource Strain (CARDIA)     Difficulty of Paying Living Expenses: Not hard at all   Food Insecurity: No Food Insecurity (6/12/2024)    Hunger Vital Sign

## 2024-08-10 LAB
BACTERIA SPEC CULT: NORMAL
SERVICE CMNT-IMP: NORMAL

## 2024-08-13 NOTE — PROGRESS NOTES
The ultrasound images and findings from today were reviewed with the patient.  See ultrasound report and progress note for details.       Shital Weeks MD

## 2024-08-28 LAB
Lab: ABNORMAL
Lab: POSITIVE

## 2024-08-30 ENCOUNTER — TELEPHONE (OUTPATIENT)
Age: 34
End: 2024-08-30

## 2024-08-30 DIAGNOSIS — Z13.71 GENETIC DISEASE CARRIER STATUS TESTING, FEMALE: Primary | ICD-10-CM

## 2024-08-30 NOTE — TELEPHONE ENCOUNTER
Patient called in, name and  verified. Patient is called back in regards to her most recent labs. She would like for her  to be tested for all of the 500 diseases as well. Lakeshia was telling me that the thought had come up that she may be better off seeing a specialist/. Patient understands this thought process. She would like a call to go over what her next steps should be. Please advise.

## 2024-09-03 ENCOUNTER — TELEPHONE (OUTPATIENT)
Age: 34
End: 2024-09-03

## 2024-09-03 NOTE — TELEPHONE ENCOUNTER
periods of fasting, illness, or exercise. Signs and symptoms can begin anytime between infancy and adulthood. Infants with the most severe form of VLCAD Deficiency develop symptoms in the first few months of life. VLCAD Deficiency causes a thickening of the heart muscle (cardiomyopathy) which causes the heart not to work properly. It can also cause an abnormal heart rhythm and/or fluid around the heart. Infants with VLCAD Deficiency can have poor muscle tone, lack of energy, an enlarged liver, and periods of low blood sugar (hypoglycemia). If not treated, affected infants can die. With early diagnosis and lifelong treatment, infants with VLCAD Deficiency can survive and may have healthy growth and development. Affected individuals who develop symptoms later in childhood may not have heart disease. People with the childhood?onset form typically have low blood sugar, an enlarged liver, and muscle weakness, especially after exercise. Some individuals with VLCAD Deficiency have signs and symptoms that do not begin until adulthood. This milder form typically does not affect the heart and may or may not cause low blood sugar. Individuals with VLCAD Deficiency that begins in adulthood may have muscle cramps and pain, often following exercise. If untreated, the muscles in the body start breaking down and kidney damage can occur. With careful treatment, people with the childhood and adult forms of VLCAD Deficiency can live healthy lives with typical growth and development. VLCAD Deficiency is caused by a gene change, or mutation, in both copies of the ACADVL gene pair. VLCAD Deficiency is inherited in an autosomal recessive manner. People who are carriers for VLCAD Deficiency are usually healthy and do not have symptoms nor do they have VLCAD Deficiency themselves. Women who have a pregnancy in which the fetus has VLCAD Deficiency are also at risk for pregnancy complications such as HELLP syndrome (Hemolysis, Elevated Liver

## 2024-09-19 ENCOUNTER — TELEPHONE (OUTPATIENT)
Age: 34
End: 2024-09-19

## 2024-09-27 ENCOUNTER — TELEPHONE (OUTPATIENT)
Age: 34
End: 2024-09-27